# Patient Record
Sex: FEMALE | Race: WHITE | URBAN - METROPOLITAN AREA
[De-identification: names, ages, dates, MRNs, and addresses within clinical notes are randomized per-mention and may not be internally consistent; named-entity substitution may affect disease eponyms.]

---

## 2020-06-09 ENCOUNTER — OFFICE VISIT (OUTPATIENT)
Dept: ENDOCRINOLOGY | Age: 30
End: 2020-06-09
Payer: COMMERCIAL

## 2020-06-09 VITALS
OXYGEN SATURATION: 97 % | HEIGHT: 70 IN | BODY MASS INDEX: 29.6 KG/M2 | WEIGHT: 206.8 LBS | DIASTOLIC BLOOD PRESSURE: 82 MMHG | SYSTOLIC BLOOD PRESSURE: 132 MMHG | TEMPERATURE: 97.9 F | HEART RATE: 76 BPM

## 2020-06-09 PROBLEM — E03.9 ACQUIRED HYPOTHYROIDISM: Status: ACTIVE | Noted: 2020-06-09

## 2020-06-09 PROBLEM — E66.9 CLASS 1 OBESITY WITH BODY MASS INDEX (BMI) OF 30.0 TO 30.9 IN ADULT: Status: ACTIVE | Noted: 2020-06-09

## 2020-06-09 PROBLEM — E06.3 HASHIMOTO'S THYROIDITIS: Status: ACTIVE | Noted: 2020-06-09

## 2020-06-09 PROBLEM — N91.5 OLIGOMENORRHEA: Status: ACTIVE | Noted: 2020-06-09

## 2020-06-09 PROBLEM — R63.5 WEIGHT GAIN, ABNORMAL: Status: ACTIVE | Noted: 2020-06-09

## 2020-06-09 PROCEDURE — 99244 OFF/OP CNSLTJ NEW/EST MOD 40: CPT | Performed by: INTERNAL MEDICINE

## 2020-06-09 RX ORDER — LEVOTHYROXINE SODIUM 0.07 MG/1
TABLET ORAL
Qty: 30 TABLET | Refills: 0
Start: 2020-06-09 | End: 2020-08-24 | Stop reason: SDUPTHER

## 2020-06-09 RX ORDER — FAMOTIDINE 40 MG/1
TABLET, FILM COATED ORAL
COMMUNITY
Start: 2020-06-01

## 2020-06-09 RX ORDER — PANTOPRAZOLE SODIUM 40 MG/1
TABLET, DELAYED RELEASE ORAL
COMMUNITY
Start: 2020-05-29

## 2020-06-09 RX ORDER — LEVOTHYROXINE SODIUM 0.07 MG/1
TABLET ORAL
COMMUNITY
Start: 2020-05-18 | End: 2020-06-09

## 2020-06-09 NOTE — PROGRESS NOTES
FOLATE; Future    3. Class 1 obesity with body mass index (BMI) of 30.0 to 30.9 in adult, unspecified obesity type, unspecified whether serious comorbidity present    - ACTH; Future  - Cortisol AM, Total; Future  - Anti-Thyroglobulin Antibody; Future  - Thyroid Peroxidase Antibody; Future  - Comprehensive Metabolic Panel; Future  - TSH without Reflex; Future  - T3, Free; Future  - T4; Future  - T4, Free; Future  - T3; Future  - T3, Reverse; Future  - VITAMIN B12 & FOLATE; Future    4. Weight gain, abnormal  Gained 50 lbs in 1 year. - Salivary Cortisol    5. Oligomenorrhea  Irregular prior to BCP and IUD  Longest time without period 2.5 months. No hirsutism  - ACTH, cortisol  - Salivary cortisol      Reviewed and/or ordered clinical lab results Yes  Reviewed and/or ordered radiology tests Yes   Reviewed and/or ordered other diagnostic tests No  Discussed test results with performing physician No  Independently reviewed image, tracing, or specimen No  Made a decision to obtain old records No  Reviewed and summarized old records Yes   TSH 3.8-4.47  T4 8.8  T3 129  TPO antibody 51, elevated  Obtained history from other than patient No    Gian Daniels was counseled regarding symptoms of thyroid, oligomenorrhea diagnosis, course and complications of disease if inadequately treated, side effects of medications, diagnosis, treatment options, and prognosis, risks, benefits, complications, and alternatives of treatment, labs, imaging and other studies and treatment targets and goals, work-up, treatment options, different thyroid replacement medications, side effects and benefits, causes of weight gain, cortisol problems, irregular period problems. She understands instructions and counseling. Total visit time 60 minutes, more than 50% was spent in counseling. See assessment, plan and counseling note for details    Return in about 3 weeks (around 6/30/2020) for thyroid problems.     Electronically signed by Linda Moreno Jessi Mckenna MD on 6/9/2020 at 10:33 PM

## 2020-06-30 ENCOUNTER — OFFICE VISIT (OUTPATIENT)
Dept: ENDOCRINOLOGY | Age: 30
End: 2020-06-30
Payer: COMMERCIAL

## 2020-06-30 VITALS
HEART RATE: 87 BPM | TEMPERATURE: 97.5 F | SYSTOLIC BLOOD PRESSURE: 139 MMHG | OXYGEN SATURATION: 97 % | WEIGHT: 208.6 LBS | DIASTOLIC BLOOD PRESSURE: 77 MMHG | BODY MASS INDEX: 29.86 KG/M2 | HEIGHT: 70 IN

## 2020-06-30 PROCEDURE — 99214 OFFICE O/P EST MOD 30 MIN: CPT | Performed by: INTERNAL MEDICINE

## 2020-06-30 RX ORDER — MONTELUKAST SODIUM 10 MG/1
10 TABLET ORAL NIGHTLY
COMMUNITY

## 2020-06-30 RX ORDER — LEVOTHYROXINE SODIUM 0.07 MG/1
TABLET ORAL
Qty: 30 TABLET | Refills: 3 | Status: CANCELLED | OUTPATIENT
Start: 2020-06-30

## 2020-06-30 RX ORDER — CETIRIZINE HYDROCHLORIDE 10 MG/1
10 TABLET ORAL DAILY
COMMUNITY

## 2020-06-30 NOTE — PROGRESS NOTES
seen    Left Lobe: 3 x 1.3 x 1.1 cm. No cysts or nodules are seen   Other Result Information   Interface, Results In - 05/20/2020  8:46 AM EDT  EXAM: US THYROID-NECK-HEAD     INDICATION:  Myxedema heart disease, Myxedema heart disease    COMPARISON:  None    TECHNIQUE:  Multiplanar grayscale analysis of the thyroid was performed. FINDINGS:    The thyroid gland appears normal in size, contour, and echogenicity bilaterally. Isthmus: 3 mm in anteroposterior dimension. No cysts or nodules are seen    Right Lobe: 4 x 2 x 1.3 cm. No cysts or nodules are seen    Left Lobe: 3 x 1.3 x 1.1 cm. No cysts or nodules are seen        IMPRESSION:    No focal lesion identified. Normal study. Approved by Renetta Chavira MD on 5/20/2020 8:26 AM EDT    I have personally reviewed the images and I agree with this report.     Report Verified by: Hemant Delarosa MD at 5/20/2020 8:44 AM EDT         Past Medical History:   Diagnosis Date    Autoimmune disorder ([facility] [address]     Thyroid disease      Patient Active Problem List    Diagnosis Date Noted    Acquired hypothyroidism 06/09/2020    Hashimoto's thyroiditis 06/09/2020    Class 1 obesity with body mass index (BMI) of 30.0 to 30.9 in adult 06/09/2020    Weight gain, abnormal 06/09/2020    Oligomenorrhea 06/09/2020     Past Surgical History:   Procedure Laterality Date    TONSILLECTOMY AND ADENOIDECTOMY  2001    WISDOM TOOTH EXTRACTION  2011     Family History   Problem Relation Age of Onset    No Known Problems Mother     Hypertension Father     High Cholesterol Father      Social History     Socioeconomic History    Marital status:      Spouse name: None    Number of children: None    Years of education: None    Highest education level: None   Occupational History    None   Social Needs    Financial resource strain: None    Food insecurity     Worry: None     Inability: None    Transportation needs     Medical: None     Non-medical: None   Tobacco Use    breath, no wheezing, no dyspnea on exertion, no cough  Cardiovascular: no chest pain, no lower extremity edema, no orthopnea, no intermittent leg claudication, no palpitations  Gastrointestinal: no abdominal pain, no nausea, no vomiting, no diarrhea, no constipation, no dysphagia, no heartburn, no bloating  Genitourinary: no dysuria, no urinary incontinence, no urinary hesitancy, no urinary frequency, no feelings of urinary urgency, no nocturia  Musculoskeletal: no joint swelling, no joint stiffness, no joint pain, no muscle cramps, no muscle pain, no bone pain  Integument/Breast: no hair loss, no skin rashes, no skin lesions, no itching, has dry skin  Neurological: no numbness, no tingling, no weakness, no confusion, no headaches, no dizziness, no fainting, no tremors, no decrease in memory, no balance problems  Psychiatric: has anxiety, no depression, has insomnia  Hematologic/Lymphatic: no tendency for easy bleeding, no swollen lymph nodes, no tendency for easy bruising  Immunology: has seasonal allergies, no frequent infections, no frequent illnesses  Endocrine: no temperature intolerance    /77 (Site: Left Upper Arm, Position: Sitting, Cuff Size: Medium Adult)   Pulse 87   Temp 97.5 °F (36.4 °C) (Infrared)   Ht 5' 9.5\" (1.765 m)   Wt 208 lb 9.6 oz (94.6 kg)   SpO2 97%   BMI 30.36 kg/m²    Wt Readings from Last 3 Encounters:   06/30/20 208 lb 9.6 oz (94.6 kg)   06/09/20 206 lb 12.8 oz (93.8 kg)     Body mass index is 30.36 kg/m².     OBJECTIVE:  Constitutional: no acute distress, well appearing and well nourished  Psychiatric: oriented to person, place and time, judgement and insight and normal, recent and remote memory and intact and mood and affect are normal  Skin: skin and subcutaneous tissue is normal without mass, normal turgor  Head and Face: examination of head and face revealed no abnormalities  Eyes: no lid or conjunctival swelling, erythema or discharge, pupils are normal, equal, round,

## 2020-07-13 DIAGNOSIS — E06.3 HASHIMOTO'S THYROIDITIS: ICD-10-CM

## 2020-07-13 DIAGNOSIS — R63.5 WEIGHT GAIN, ABNORMAL: ICD-10-CM

## 2020-07-13 DIAGNOSIS — E03.9 ACQUIRED HYPOTHYROIDISM: ICD-10-CM

## 2020-07-13 DIAGNOSIS — E66.9 CLASS 1 OBESITY WITH BODY MASS INDEX (BMI) OF 30.0 TO 30.9 IN ADULT, UNSPECIFIED OBESITY TYPE, UNSPECIFIED WHETHER SERIOUS COMORBIDITY PRESENT: ICD-10-CM

## 2020-07-13 DIAGNOSIS — N91.5 OLIGOMENORRHEA, UNSPECIFIED TYPE: ICD-10-CM

## 2020-07-14 ENCOUNTER — VIRTUAL VISIT (OUTPATIENT)
Dept: ENDOCRINOLOGY | Age: 30
End: 2020-07-14
Payer: COMMERCIAL

## 2020-07-14 PROBLEM — R73.01 IFG (IMPAIRED FASTING GLUCOSE): Status: ACTIVE | Noted: 2020-07-14

## 2020-07-14 PROBLEM — E53.8 FOLIC ACID DEFICIENCY: Status: ACTIVE | Noted: 2020-07-14

## 2020-07-14 LAB
IGA: 223 MG/DL (ref 70–400)
TISSUE TRANSGLUTAMINASE IGA: <0.5 U/ML (ref 0–14)

## 2020-07-14 PROCEDURE — 99214 OFFICE O/P EST MOD 30 MIN: CPT | Performed by: INTERNAL MEDICINE

## 2020-07-14 RX ORDER — LANOLIN ALCOHOL/MO/W.PET/CERES
800 CREAM (GRAM) TOPICAL DAILY
Qty: 30 TABLET | Refills: 3
Start: 2020-07-14

## 2020-07-14 NOTE — PROGRESS NOTES
disease, Myxedema heart disease    COMPARISON:  None    TECHNIQUE:  Multiplanar grayscale analysis of the thyroid was performed. FINDINGS:    The thyroid gland appears normal in size, contour, and echogenicity bilaterally. Isthmus: 3 mm in anteroposterior dimension. No cysts or nodules are seen    Right Lobe: 4 x 2 x 1.3 cm. No cysts or nodules are seen    Left Lobe: 3 x 1.3 x 1.1 cm. No cysts or nodules are seen   Other Result Information   Interface, Results In - 05/20/2020  8:46 AM EDT  EXAM: US THYROID-NECK-HEAD     INDICATION:  Myxedema heart disease, Myxedema heart disease    COMPARISON:  None    TECHNIQUE:  Multiplanar grayscale analysis of the thyroid was performed. FINDINGS:    The thyroid gland appears normal in size, contour, and echogenicity bilaterally. Isthmus: 3 mm in anteroposterior dimension. No cysts or nodules are seen    Right Lobe: 4 x 2 x 1.3 cm. No cysts or nodules are seen    Left Lobe: 3 x 1.3 x 1.1 cm. No cysts or nodules are seen        IMPRESSION:    No focal lesion identified. Normal study. Approved by Rory Gordon MD on 5/20/2020 8:26 AM EDT    I have personally reviewed the images and I agree with this report.     Report Verified by: Juan Diego Campos MD at 5/20/2020 8:44 AM EDT         Past Medical History:   Diagnosis Date    Autoimmune disorder ([facility] [address]     Thyroid disease      Patient Active Problem List    Diagnosis Date Noted    IFG (impaired fasting glucose) 57/91/6461    Folic acid deficiency 06/32/6129    Acquired hypothyroidism 06/09/2020    Hashimoto's thyroiditis 06/09/2020    Class 1 obesity with body mass index (BMI) of 30.0 to 30.9 in adult 06/09/2020    Weight gain, abnormal 06/09/2020    Oligomenorrhea 06/09/2020     Past Surgical History:   Procedure Laterality Date    TONSILLECTOMY AND ADENOIDECTOMY  2001    WISDOM TOOTH EXTRACTION  2011     Family History   Problem Relation Age of Onset    No Known Problems Mother     Hypertension Father  High Cholesterol Father      Social History     Socioeconomic History    Marital status:      Spouse name: None    Number of children: None    Years of education: None    Highest education level: None   Occupational History    None   Social Needs    Financial resource strain: None    Food insecurity     Worry: None     Inability: None    Transportation needs     Medical: None     Non-medical: None   Tobacco Use    Smoking status: Never Smoker    Smokeless tobacco: Never Used   Substance and Sexual Activity    Alcohol use: Not Currently    Drug use: Never    Sexual activity: Yes     Partners: Male   Lifestyle    Physical activity     Days per week: None     Minutes per session: None    Stress: None   Relationships    Social connections     Talks on phone: None     Gets together: None     Attends Church service: None     Active member of club or organization: None     Attends meetings of clubs or organizations: None     Relationship status: None    Intimate partner violence     Fear of current or ex partner: None     Emotionally abused: None     Physically abused: None     Forced sexual activity: None   Other Topics Concern    None   Social History Narrative    None     Current Outpatient Medications   Medication Sig Dispense Refill    folic acid (FOLATE) 620 MCG tablet Take 2 tablets by mouth daily 30 tablet 3    cyanocobalamin ([facility] VITAMIN B12) 1000 MCG tablet Take 1 tablet by mouth daily 30 tablet 3    montelukast (SINGULAIR) 10 MG tablet Take 10 mg by mouth nightly      cetirizine (ZYRTEC) 10 MG tablet Take 10 mg by mouth daily      pantoprazole (PROTONIX) 40 MG tablet TAKE 1 TABLET BY MOUTH EVERY DAY      sertraline (ZOLOFT) 50 MG tablet TAKE 1 TABLET BY MOUTH EVERY DAY      famotidine (PEPCID) 40 MG tablet TAKE 1 TABLET BY MOUTH EVERY DAY      levothyroxine (SYNTHROID) 75 MCG tablet TAKE 1 TABLET BY MOUTH EVERY DAY 30 tablet 0     No current facility-administered calculate BMI.     OBJECTIVE:  Constitutional: no apparent distress, well developed and well nourished  Mental status: alert and awake, oriented to person, place and time, able to follow commands  Psychiatric: judgement and insight and normal, recent and remote memory are intact, mood and affect are normal  Skin: skin inspection appears normal, no significant exanthematous lesions or discoloration noted on facial skin  Head and Face: head and face inspection revealed no abnormalities, normocephalic, atraumatic  Eyes: no lid or conjunctival swelling, erythema or discharge, sclera appears normal  Ears/Nose: external inspection of ears and nose revealed no abnormalities, hearing is grossly normal  Oropharynx/Mouth/Face: lips are normal with no lesions, the voice quality was normal  Neck: neck is symmetric, no visualized mass  Pulmonary/chest: respiratory effort normal, no generalized signs of difficulty breathing or signs of respiratory distress  Musculoskeletal: normal station, normal range of motion of neck  Neurological: no facial asymmetry, normal general cortical function      Lab Review:    No results found for: WBC, HGB, HCT, MCV, PLT  Lab Results   Component Value Date     06/30/2020    K 4.4 06/30/2020     06/30/2020    CO2 24 06/30/2020    BUN 12 06/30/2020    CREATININE 0.6 06/30/2020    GLUCOSE 106 06/30/2020    CALCIUM 9.5 06/30/2020    PROT 7.1 06/30/2020    LABALBU 4.4 06/30/2020    BILITOT <0.2 06/30/2020    ALKPHOS 99 06/30/2020    AST 15 06/30/2020    ALT 10 06/30/2020    LABGLOM >60 06/30/2020    GFRAA >60 06/30/2020    AGRATIO 1.6 06/30/2020    GLOB 2.7 06/30/2020     Lab Results   Component Value Date    TSH 0.99 06/30/2020    FT3 3.3 06/30/2020     No results found for: LABA1C  No results found for: EAG  No results found for: CHOL  No results found for: TRIG  No results found for: HDL  No results found for: LDLCHOLESTEROL, LDLCALC  No results found for: LABVLDL, VLDL  No results found for: CHOLHDLRATIO  No results found for: LABMICR, [id number]  No results found for: VITD25     ASSESSMENT/PLAN:  1. Acquired hypothyroidism  TSH 0.99  Recommend to continue levothyroxine 0.075 mg daily  - TSH without Reflex; Future  - T3, Free; Future  - T4, Free; Future    2. Hashimoto's thyroiditis  TPO 45  - Anti-Thyroglobulin Antibody; Future  - Thyroid Peroxidase Antibody; Future  -Thyroid sonogram normal    3. Class 1 obesity with body mass index (BMI) of 30.0 to 30.9 in adult, unspecified obesity type, unspecified whether serious comorbidity present  -Diet and exercise    4. Weight gain, abnormal  Celiac screening negative  Gained 50 lbs in 1 year. - Salivary Cortisol pending, call for results    5. Oligomenorrhea  Irregular prior to BCP and IUD  Longest time without period 2.5 months. No hirsutism  - ACTH, cortisol  - Salivary cortisol  -LH, FSH, estradiol  -Prolactin    6. IFG  Uncle has type 2 diabetes  Repeat glucose  HbA1C    7.  Folic acid deficiency  Start folate 800 IU  daily    Reviewed and/or ordered clinical lab results Yes  Reviewed and/or ordered radiology tests Yes   Reviewed and/or ordered other diagnostic tests No  Discussed test results with performing physician No  Independently reviewed image, tracing, or specimen No  Made a decision to obtain old records No  Reviewed and summarized old records Yes   TSH 3.8-4.47  T4 8.8  T3 129  TPO antibody 51, elevated  Obtained history from other than patient No    Toya Stoner was counseled regarding symptoms of thyroid, oligomenorrhea diagnosis, course and complications of disease if inadequately treated, side effects of medications, diagnosis, treatment options, and prognosis, risks, benefits, complications, and alternatives of treatment, labs, imaging and other studies and treatment targets and goals, work-up, treatment options, different thyroid replacement medications, side effects and benefits, causes of weight gain, cortisol problems, irregular period causes, work-up. She understands instructions and counseling. Christian Reeves is a 31 y.o. female being evaluated by a Virtual Visit (video visit) encounter, including two-way audio and video communication, in lieu of an in-person visit due to coronavirus emergency, to address concerns as mentioned in history and assessment and plan. Patient identification was verified at the start of the visit. I conducted an interview, performed a limited exam by video and educated the patient on my assessment and plan. Due to this being a TeleHealth encounter (During hospitalsP-24 public health emergency), evaluation of the following organ systems was limited: Vitals/Constitutional/EENT/Resp/CV/GI//MS/Neuro/Skin/Heme-Lymph-Imm. Pursuant to the emergency declaration under the [address] [place] [address], [address] [place] [address] authority and the [employer] Act, this Virtual Visit was conducted with patient's (and/or legal guardian's) consent, to reduce the patient's risk of exposure to COVID-19 and provide necessary medical care. The patient (and/or legal guardian) has also been advised to contact this office for worsening conditions or problems, and seek emergency medical treatment and/or call 911 if deemed necessary. Total time spent on this encounter via Telehealth (synchronous, real-time audio/visual connection): 25 min. See assessment, plan and counseling note for counseling and care coordination details. Services were provided through a video synchronous discussion virtually to substitute for in-person clinic visit. Persons participating in the telehealth service: provider - Farooq Kee MD and patient Christian Reeves. Provider was located at her office. Patient was located at home. --Farooq Kee MD on 7/14/2020 at 11:35 PM    An electronic signature was used to authenticate this note.       Return in

## 2020-07-15 LAB
CORTISOL SALIVARY: 0.04 UG/DL
CORTISOL SALIVARY: 0.05 UG/DL
CORTISOL SALIVARY: 0.06 UG/DL

## 2020-07-21 ENCOUNTER — TELEPHONE (OUTPATIENT)
Dept: ENDOCRINOLOGY | Age: 30
End: 2020-07-21

## 2020-08-24 RX ORDER — LEVOTHYROXINE SODIUM 0.07 MG/1
TABLET ORAL
Qty: 90 TABLET | Refills: 0 | Status: SHIPPED | OUTPATIENT
Start: 2020-08-24 | End: 2020-09-22

## 2020-09-22 RX ORDER — LEVOTHYROXINE SODIUM 0.07 MG/1
TABLET ORAL
Qty: 30 TABLET | Refills: 0 | Status: SHIPPED | OUTPATIENT
Start: 2020-09-22 | End: 2020-10-15 | Stop reason: SDUPTHER

## 2020-10-15 RX ORDER — LEVOTHYROXINE SODIUM 0.07 MG/1
TABLET ORAL
Qty: 30 TABLET | Refills: 0
Start: 2020-10-15 | End: 2020-10-16

## 2020-10-16 ENCOUNTER — TELEPHONE (OUTPATIENT)
Dept: ENDOCRINOLOGY | Age: 30
End: 2020-10-16

## 2020-10-16 ENCOUNTER — OFFICE VISIT (OUTPATIENT)
Dept: ENDOCRINOLOGY | Age: 30
End: 2020-10-16
Payer: COMMERCIAL

## 2020-10-16 VITALS
DIASTOLIC BLOOD PRESSURE: 72 MMHG | WEIGHT: 222 LBS | BODY MASS INDEX: 32.31 KG/M2 | TEMPERATURE: 98.2 F | HEART RATE: 74 BPM | SYSTOLIC BLOOD PRESSURE: 131 MMHG

## 2020-10-16 DIAGNOSIS — N91.5 OLIGOMENORRHEA, UNSPECIFIED TYPE: ICD-10-CM

## 2020-10-16 DIAGNOSIS — R73.01 IFG (IMPAIRED FASTING GLUCOSE): ICD-10-CM

## 2020-10-16 DIAGNOSIS — E53.8 FOLIC ACID DEFICIENCY: ICD-10-CM

## 2020-10-16 DIAGNOSIS — E03.9 ACQUIRED HYPOTHYROIDISM: ICD-10-CM

## 2020-10-16 DIAGNOSIS — R63.5 WEIGHT GAIN, ABNORMAL: ICD-10-CM

## 2020-10-16 LAB
A/G RATIO: 1.5 (ref 1.1–2.2)
ALBUMIN SERPL-MCNC: 4.1 G/DL (ref 3.4–5)
ALP BLD-CCNC: 111 U/L (ref 40–129)
ALT SERPL-CCNC: 11 U/L (ref 10–40)
ANION GAP SERPL CALCULATED.3IONS-SCNC: 11 MMOL/L (ref 3–16)
AST SERPL-CCNC: 14 U/L (ref 15–37)
BILIRUB SERPL-MCNC: <0.2 MG/DL (ref 0–1)
BUN BLDV-MCNC: 16 MG/DL (ref 7–20)
CALCIUM SERPL-MCNC: 9.2 MG/DL (ref 8.3–10.6)
CHLORIDE BLD-SCNC: 102 MMOL/L (ref 99–110)
CO2: 25 MMOL/L (ref 21–32)
CREAT SERPL-MCNC: 0.7 MG/DL (ref 0.6–1.1)
ESTIMATED AVERAGE GLUCOSE: 111.2 MG/DL
FOLATE: 14.74 NG/ML (ref 4.78–24.2)
GFR AFRICAN AMERICAN: >60
GFR NON-AFRICAN AMERICAN: >60
GLOBULIN: 2.7 G/DL
GLUCOSE BLD-MCNC: 104 MG/DL (ref 70–99)
HBA1C MFR BLD: 5.5 %
POTASSIUM SERPL-SCNC: 4.3 MMOL/L (ref 3.5–5.1)
SODIUM BLD-SCNC: 138 MMOL/L (ref 136–145)
T4 FREE: 1.2 NG/DL (ref 0.9–1.8)
TOTAL PROTEIN: 6.8 G/DL (ref 6.4–8.2)
TSH SERPL DL<=0.05 MIU/L-ACNC: 2.14 UIU/ML (ref 0.27–4.2)
VITAMIN B-12: 454 PG/ML (ref 211–911)

## 2020-10-16 PROCEDURE — 99214 OFFICE O/P EST MOD 30 MIN: CPT | Performed by: INTERNAL MEDICINE

## 2020-10-16 RX ORDER — LEVOTHYROXINE SODIUM 0.07 MG/1
TABLET ORAL
Qty: 90 TABLET | Refills: 1 | Status: SHIPPED | OUTPATIENT
Start: 2020-10-16 | End: 2021-02-28

## 2020-10-16 NOTE — PROGRESS NOTES
SUBJECTIVE:  Jose Alberto Noriega is a 31 y.o. female who is being evaluated for hypothyroidism. 1. Acquired hypothyroidism    This started in 2019. Patient was diagnosed with hypothyroidism. The problem has been gradually worsening. Previous thyroid studies include: TSH and free thyroxine. Patient started medication in 2019. Currently patient is on: levothyroxine. Misses  0 doses a month. Current complaints: fatigue, dry skin. Periods light on IUD    2. Hashimoto's thyroiditis  History of obstructive symptoms: difficulty swallowing No, changes in voice/hoarseness No.  History of radiation to patient's neck: No  Resent iodine exposure: No  Family history includes no thyroid abnormalities. Family history of thyroid cancer: No    3. Obesity  Gained 50 lbs in 1 year. There was no change in diet and activity level. On modified paleo diet, gluten free, dairy free. Eats healthy. Ideal weight 145 lbs. Goal weight 145-155 lbs    4. Weight gain, abnormal  Gained 50 lbs in 1 year. 5. Oligomenorrhea  Irregular prior to BCP and IUD  Longest time without period 2.5 months. No hirsutism    6. Elevated fasting glucose   Glucose 106    7. Folic acid deficiency  Folate 4.22    IMPRESSION:    No focal lesion identified. Normal study. Approved by Lissette Armando MD on 5/20/2020 8:26 AM EDT    I have personally reviewed the images and I agree with this report. Report Verified by: Angle Yao MD at 5/20/2020 8:44 AM EDT   Result Narrative   EXAM: US THYROID-NECK-HEAD     INDICATION:  Myxedema heart disease, Myxedema heart disease    COMPARISON:  None    TECHNIQUE:  Multiplanar grayscale analysis of the thyroid was performed. FINDINGS:    The thyroid gland appears normal in size, contour, and echogenicity bilaterally. Isthmus: 3 mm in anteroposterior dimension. No cysts or nodules are seen    Right Lobe: 4 x 2 x 1.3 cm. No cysts or nodules are seen    Left Lobe: 3 x 1.3 x 1.1 cm.  No cysts or nodules are decrease in hearing, no hoarseness, no dry mouth, has sinus problems, no difficulty swallowing, no neck lumps, no dental problems, no mouth sores, no ringing in ears  Pulmonary: no shortness of breath, no wheezing, no dyspnea on exertion, no cough  Cardiovascular: no chest pain, no lower extremity edema, no orthopnea, no intermittent leg claudication, no palpitations  Gastrointestinal: no abdominal pain, no nausea, no vomiting, no diarrhea, no constipation, no dysphagia, no heartburn, no bloating  Genitourinary: no dysuria, no urinary incontinence, no urinary hesitancy, no urinary frequency, no feelings of urinary urgency, no nocturia  Musculoskeletal: no joint swelling, no joint stiffness, no joint pain, no muscle cramps, no muscle pain, no bone pain  Integument/Breast: no hair loss, no skin rashes, no skin lesions, no itching, has dry skin  Neurological: no numbness, no tingling, no weakness, no confusion, no headaches, no dizziness, no fainting, no tremors, no decrease in memory, no balance problems  Psychiatric: has anxiety, no depression, has insomnia  Hematologic/Lymphatic: no tendency for easy bleeding, no swollen lymph nodes, no tendency for easy bruising  Immunology: has seasonal allergies, no frequent infections, no frequent illnesses  Endocrine: no temperature intolerance    /72   Pulse 74   Temp 98.2 °F (36.8 °C) (Temporal)   Wt 222 lb (100.7 kg)   BMI 32.31 kg/m²    Wt Readings from Last 3 Encounters:   10/16/20 222 lb (100.7 kg)   06/30/20 208 lb 9.6 oz (94.6 kg)   06/09/20 206 lb 12.8 oz (93.8 kg)     Body mass index is 32.31 kg/m².     OBJECTIVE:  Constitutional: no acute distress, well appearing and well nourished  Psychiatric: oriented to person, place and time, judgement and insight and normal, recent and remote memory and intact and mood and affect are normal  Skin: skin and subcutaneous tissue is normal without mass, normal turgor  Head and Face: examination of head and face revealed no abnormalities  Eyes: no lid or conjunctival swelling, erythema or discharge, pupils are normal, equal, round, reactive to light  Ears/Nose: external inspection of ears and nose revealed no abnormalities, hearing is grossly normal  Oropharynx/Mouth/Face: lips, tongue and gums are normal with no lesions, the voice quality was normal  Neck: neck is supple and symmetric, with midline trachea and no masses, thyroid is normal  Lymphatics: normal cervical lymph nodes, normal supraclavicular nodes  Pulmonary: no increased work of breathing or signs of respiratory distress, lungs are clear to auscultation  Cardiovascular: normal heart rate and rhythm, normal S1 and S2, no murmurs and pedal pulses and 2+ bilaterally, No edema  Abdomen: abdomen is soft, non-tender with no masses  Musculoskeletal: normal gait and station and exam of the digits and nails are normal  Neurological: normal coordination and normal general cortical function      Lab Review:    No results found for: WBC, HGB, HCT, MCV, PLT  Lab Results   Component Value Date     06/30/2020    K 4.4 06/30/2020     06/30/2020    CO2 24 06/30/2020    BUN 12 06/30/2020    CREATININE 0.6 06/30/2020    GLUCOSE 106 06/30/2020    CALCIUM 9.5 06/30/2020    PROT 7.1 06/30/2020    LABALBU 4.4 06/30/2020    BILITOT <0.2 06/30/2020    ALKPHOS 99 06/30/2020    AST 15 06/30/2020    ALT 10 06/30/2020    LABGLOM >60 06/30/2020    GFRAA >60 06/30/2020    AGRATIO 1.6 06/30/2020    GLOB 2.7 06/30/2020     Lab Results   Component Value Date    TSH 0.99 06/30/2020    FT3 3.3 06/30/2020     No results found for: LABA1C  No results found for: EAG  No results found for: CHOL  No results found for: TRIG  No results found for: HDL  No results found for: LDLCHOLESTEROL, LDLCALC  No results found for: LABVLDL, VLDL  No results found for: CHOLHDLRATIO  No results found for: LABMICR, [id number]  No results found for: VITD25     ASSESSMENT/PLAN:  1.  Acquired hypothyroidism  Call for results  TSH 0.99  Recommend to continue levothyroxine 0.075 mg daily  Reevaluate when results are available  Patient did not do lab work. - TSH without Reflex; Future  - T3, Free; Future  - T4, Free; Future    2. Hashimoto's thyroiditis  TPO antibody positive  -Thyroid sonogram normal    3. Obesity  -Diet and exercise    4. Weight gain, abnormal  Gained 50 lbs in 1 year. - Salivary Cortisol    5. Oligomenorrhea  Testosterone, DHEAS  Obtain Pelvic US report from OB-GYN  Irregular prior to BCP and IUD  Longest time without period 2.5 months. No hirsutism  - ACTH, cortisol normal  - Salivary cortisol normal  -LH, FSH, estradiol  -Prolactin normal    6. Elevated fasting glucose   Glucose 106 fasting    7. Folic acid deficiency  Folate 4.22      Reviewed and/or ordered clinical lab results Yes  Reviewed and/or ordered radiology tests Yes   Reviewed and/or ordered other diagnostic tests No  Discussed test results with performing physician No  Independently reviewed image, tracing, or specimen No  Made a decision to obtain old records No  Reviewed and summarized old records Yes   TSH 3.8-4.47  T4 8.8  T3 129  TPO antibody 51, elevated  Obtained history from other than patient No    Hershell Model was counseled regarding symptoms of thyroid, oligomenorrhea diagnosis, course and complications of disease if inadequately treated, side effects of medications, diagnosis, treatment options, and prognosis, risks, benefits, complications, and alternatives of treatment, labs, imaging and other studies and treatment targets and goals, work-up, treatment options, different thyroid replacement medications, side effects and benefits, causes of weight gain, cortisol problems, irregular period causes some work-up. She understands instructions and counseling. Total visit time 25 minutes, more than 50% was spent in counseling.   See assessment, plan and counseling note for details    Return in about 4 months (around 2/16/2021) for thyroid problems.     Electronically signed by Yocasta Patel MD on 10/16/2020 at 7:26 AM

## 2020-10-16 NOTE — TELEPHONE ENCOUNTER
Pt called and states that the office has to call her OBGYN office and request the Ultrasound (Uterus) that Dr Derek Butler is requesting to review.  The # to call Shelley

## 2020-10-18 ENCOUNTER — TELEPHONE (OUTPATIENT)
Dept: ENDOCRINOLOGY | Age: 30
End: 2020-10-18

## 2020-10-19 NOTE — TELEPHONE ENCOUNTER
Please inform patient about the results:  Hemoglobin A1c 5.5, very good. Glucose was slightly elevated, 104. Thyroid test was good. Otherwise chemistry panel was good. V86 and folic acid were in range. DHEAS, testosterone not back yet. Please see patient's message regarding request from OB/GYN for her pelvic ultrasound test.  Please call her OB/GYN and requested the pelvic ultrasound report.

## 2020-10-20 LAB
SEX HORMONE BINDING GLOBULIN: 50 NMOL/L (ref 30–135)
TESTOSTERONE FREE-NONMALE: 2.6 PG/ML (ref 0.8–7.4)
TESTOSTERONE TOTAL: 19 NG/DL (ref 20–70)

## 2020-10-20 NOTE — TELEPHONE ENCOUNTER
PT called back, I gave message and she stated her understanding.  Also she will sign the waiver at her OB-GYN's office so that they will release her Pelvic US report to Dr. Nan España by fax

## 2020-10-21 ENCOUNTER — TELEPHONE (OUTPATIENT)
Dept: ENDOCRINOLOGY | Age: 30
End: 2020-10-21

## 2020-10-21 LAB — DHEAS (DHEA SULFATE): 159 UG/DL (ref 65–380)

## 2020-10-29 ENCOUNTER — TELEPHONE (OUTPATIENT)
Dept: ENDOCRINOLOGY | Age: 30
End: 2020-10-29

## 2020-10-29 NOTE — TELEPHONE ENCOUNTER
PT has a question to ask Dr. Alli Dickinson. She wants to know if it's ok with her if she sees a Functional Family Provider in addition to seeing Dr. Alli Dickinson.

## 2020-10-30 NOTE — TELEPHONE ENCOUNTER
It is okay to see functional medicine doctor. She can come to see me for follow-up appointment afterwards as scheduled and then we can decide. It all depends what specifically functional medicine physician will be treating.

## 2021-02-19 ENCOUNTER — OFFICE VISIT (OUTPATIENT)
Dept: ENDOCRINOLOGY | Age: 31
End: 2021-02-19
Payer: COMMERCIAL

## 2021-02-19 VITALS
HEIGHT: 70 IN | RESPIRATION RATE: 14 BRPM | DIASTOLIC BLOOD PRESSURE: 74 MMHG | HEART RATE: 88 BPM | SYSTOLIC BLOOD PRESSURE: 122 MMHG | BODY MASS INDEX: 30.92 KG/M2 | WEIGHT: 216 LBS

## 2021-02-19 DIAGNOSIS — E03.9 ACQUIRED HYPOTHYROIDISM: Primary | ICD-10-CM

## 2021-02-19 DIAGNOSIS — E06.3 HASHIMOTO'S THYROIDITIS: ICD-10-CM

## 2021-02-19 DIAGNOSIS — E53.8 FOLIC ACID DEFICIENCY: ICD-10-CM

## 2021-02-19 DIAGNOSIS — N91.5 OLIGOMENORRHEA, UNSPECIFIED TYPE: ICD-10-CM

## 2021-02-19 DIAGNOSIS — E03.9 ACQUIRED HYPOTHYROIDISM: ICD-10-CM

## 2021-02-19 DIAGNOSIS — R63.5 WEIGHT GAIN, ABNORMAL: ICD-10-CM

## 2021-02-19 DIAGNOSIS — E66.9 CLASS 1 OBESITY WITH BODY MASS INDEX (BMI) OF 31.0 TO 31.9 IN ADULT, UNSPECIFIED OBESITY TYPE, UNSPECIFIED WHETHER SERIOUS COMORBIDITY PRESENT: ICD-10-CM

## 2021-02-19 DIAGNOSIS — R73.01 IFG (IMPAIRED FASTING GLUCOSE): ICD-10-CM

## 2021-02-19 LAB
A/G RATIO: 1.6 (ref 1.1–2.2)
ALBUMIN SERPL-MCNC: 4.4 G/DL (ref 3.4–5)
ALP BLD-CCNC: 100 U/L (ref 40–129)
ALT SERPL-CCNC: 11 U/L (ref 10–40)
ANION GAP SERPL CALCULATED.3IONS-SCNC: 9 MMOL/L (ref 3–16)
AST SERPL-CCNC: 14 U/L (ref 15–37)
BILIRUB SERPL-MCNC: <0.2 MG/DL (ref 0–1)
BUN BLDV-MCNC: 10 MG/DL (ref 7–20)
CALCIUM SERPL-MCNC: 9.7 MG/DL (ref 8.3–10.6)
CHLORIDE BLD-SCNC: 102 MMOL/L (ref 99–110)
CO2: 26 MMOL/L (ref 21–32)
CREAT SERPL-MCNC: 0.7 MG/DL (ref 0.6–1.1)
FOLATE: 14.51 NG/ML (ref 4.78–24.2)
GFR AFRICAN AMERICAN: >60
GFR NON-AFRICAN AMERICAN: >60
GLOBULIN: 2.8 G/DL
GLUCOSE BLD-MCNC: 89 MG/DL (ref 70–99)
POTASSIUM SERPL-SCNC: 4.2 MMOL/L (ref 3.5–5.1)
SODIUM BLD-SCNC: 137 MMOL/L (ref 136–145)
T3 FREE: 3.5 PG/ML (ref 2.3–4.2)
T4 FREE: 1.3 NG/DL (ref 0.9–1.8)
TOTAL PROTEIN: 7.2 G/DL (ref 6.4–8.2)
TSH SERPL DL<=0.05 MIU/L-ACNC: 4.63 UIU/ML (ref 0.27–4.2)
VITAMIN B-12: 618 PG/ML (ref 211–911)

## 2021-02-19 PROCEDURE — 99214 OFFICE O/P EST MOD 30 MIN: CPT | Performed by: INTERNAL MEDICINE

## 2021-02-19 NOTE — PROGRESS NOTES
SUBJECTIVE:  Tania Heredia is a 31 y.o. female who is being evaluated for hypothyroidism. 1. Acquired hypothyroidism    This started in 2019. Patient was diagnosed with hypothyroidism. The problem has been gradually worsening. Previous thyroid studies include: TSH and free thyroxine. Patient started medication in 2019. Currently patient is on: levothyroxine. Misses  0 doses a month. Current complaints: fatigue, dry skin, brain fog, decrease in memory. Patient stated that she experiences flareups once in 1 to 3 months when she has extreme fatigue, brain fog, cannot remember things and has difficulty performing daily duties or work. She states that she sleeps a lot during these episodes. They last 1 day. May improve with rest.  She experiences these flareups more during episodes of stress, and improving of stress level or rest helps with them. Periods light on IUD    2. Hashimoto's thyroiditis  History of obstructive symptoms: difficulty swallowing No, changes in voice/hoarseness No.  History of radiation to patient's neck: No  Resent iodine exposure: No  Family history includes no thyroid abnormalities. Family history of thyroid cancer: No    3. Obesity  Gained 50 lbs in 1 year. There was no change in diet and activity level. On modified paleo diet, gluten free, dairy free. Eats healthy. Ideal weight 145 lbs. Goal weight 145-155 lbs    4. Weight gain, abnormal  Gained 50 lbs in 1 year. 5. Oligomenorrhea  Irregular prior to BCP and IUD  Longest time without period 2.5 months. No hirsutism    6. Elevated fasting glucose   Glucose 106    7. Folic acid deficiency  Folate 4.22    IMPRESSION:    No focal lesion identified. Normal study. Approved by Arnold Almanzar MD on 5/20/2020 8:26 AM EDT    I have personally reviewed the images and I agree with this report.     Report Verified by: Keila You MD at 5/20/2020 8:44 AM EDT   Result Narrative   EXAM: US THYROID-NECK-HEAD     INDICATION:  Myxedema heart disease, Myxedema heart disease    COMPARISON:  None    TECHNIQUE:  Multiplanar grayscale analysis of the thyroid was performed. FINDINGS:    The thyroid gland appears normal in size, contour, and echogenicity bilaterally. Isthmus: 3 mm in anteroposterior dimension. No cysts or nodules are seen    Right Lobe: 4 x 2 x 1.3 cm. No cysts or nodules are seen    Left Lobe: 3 x 1.3 x 1.1 cm. No cysts or nodules are seen   Other Result Information   Interface, Results In - 05/20/2020  8:46 AM EDT  EXAM: US THYROID-NECK-HEAD     INDICATION:  Myxedema heart disease, Myxedema heart disease    COMPARISON:  None    TECHNIQUE:  Multiplanar grayscale analysis of the thyroid was performed. FINDINGS:    The thyroid gland appears normal in size, contour, and echogenicity bilaterally. Isthmus: 3 mm in anteroposterior dimension. No cysts or nodules are seen    Right Lobe: 4 x 2 x 1.3 cm. No cysts or nodules are seen    Left Lobe: 3 x 1.3 x 1.1 cm. No cysts or nodules are seen        IMPRESSION:    No focal lesion identified. Normal study. Approved by Sarah Rivera MD on 5/20/2020 8:26 AM EDT    I have personally reviewed the images and I agree with this report.     Report Verified by: Anh Byrne MD at 5/20/2020 8:44 AM EDT         Past Medical History:   Diagnosis Date    Autoimmune disorder ([facility] [address]     Thyroid disease      Patient Active Problem List    Diagnosis Date Noted    IFG (impaired fasting glucose) 64/62/4933    Folic acid deficiency 61/52/6029    Acquired hypothyroidism 06/09/2020    Hashimoto's thyroiditis 06/09/2020    Class 1 obesity with body mass index (BMI) of 32.0 to 32.9 in adult 06/09/2020    Weight gain, abnormal 06/09/2020    Oligomenorrhea 06/09/2020     Past Surgical History:   Procedure Laterality Date    TONSILLECTOMY AND ADENOIDECTOMY  2001    WISDOM TOOTH EXTRACTION  2011     Family History   Problem Relation Age of Onset    No Known Problems Mother    Jessica Muhammad Hypertension Father     High Cholesterol Father      Social History     Socioeconomic History    Marital status:      Spouse name: None    Number of children: None    Years of education: None    Highest education level: None   Occupational History    None   Social Needs    Financial resource strain: None    Food insecurity     Worry: None     Inability: None    Transportation needs     Medical: None     Non-medical: None   Tobacco Use    Smoking status: Never Smoker    Smokeless tobacco: Never Used   Substance and Sexual Activity    Alcohol use: Not Currently    Drug use: Never    Sexual activity: Yes     Partners: Male   Lifestyle    Physical activity     Days per week: None     Minutes per session: None    Stress: None   Relationships    Social connections     Talks on phone: None     Gets together: None     Attends Restorationist service: None     Active member of club or organization: None     Attends meetings of clubs or organizations: None     Relationship status: None    Intimate partner violence     Fear of current or ex partner: None     Emotionally abused: None     Physically abused: None     Forced sexual activity: None   Other Topics Concern    None   Social History Narrative    None     Current Outpatient Medications   Medication Sig Dispense Refill    levothyroxine (SYNTHROID) 75 MCG tablet Take 1 tablet by mouth every day.  90 tablet 1    folic acid (FOLATE) 158 MCG tablet Take 2 tablets by mouth daily 30 tablet 3    cyanocobalamin ([employer] VITAMIN B12) 1000 MCG tablet Take 1 tablet by mouth daily 30 tablet 3    montelukast (SINGULAIR) 10 MG tablet Take 10 mg by mouth nightly      cetirizine (ZYRTEC) 10 MG tablet Take 10 mg by mouth daily      sertraline (ZOLOFT) 50 MG tablet TAKE 1 TABLET BY MOUTH EVERY DAY      famotidine (PEPCID) 40 MG tablet TAKE 1 TABLET BY MOUTH EVERY DAY      pantoprazole (PROTONIX) 40 MG tablet TAKE 1 TABLET BY MOUTH EVERY DAY       No current facility-administered medications for this visit.       No Known Allergies  Family Status   Relation Name Status    Mother  Alive    Father  Alive       Review of Systems:  Constitutional: has fatigue, no fever, has recent weight gain, no recent weight loss, no changes in appetite  Eyes: no eye pain, no change in vision, no eye redness, no eye irritation, no double vision  Ears, nose, throat: has nasal congestion, no sore throat, no earache, no decrease in hearing, no hoarseness, no dry mouth, has sinus problems, no difficulty swallowing, no neck lumps, no dental problems, no mouth sores, no ringing in ears  Pulmonary: no shortness of breath, no wheezing, no dyspnea on exertion, no cough  Cardiovascular: no chest pain, no lower extremity edema, no orthopnea, no intermittent leg claudication, no palpitations  Gastrointestinal: no abdominal pain, no nausea, no vomiting, no diarrhea, no constipation, no dysphagia, no heartburn, no bloating  Genitourinary: no dysuria, no urinary incontinence, no urinary hesitancy, no urinary frequency, no feelings of urinary urgency, no nocturia  Musculoskeletal: no joint swelling, no joint stiffness, no joint pain, no muscle cramps, no muscle pain, no bone pain  Integument/Breast: no hair loss, no skin rashes, no skin lesions, no itching, has dry skin  Neurological: no numbness, no tingling, no weakness, no confusion, no headaches, no dizziness, no fainting, no tremors, no decrease in memory, no balance problems  Psychiatric: has anxiety, no depression, has insomnia  Hematologic/Lymphatic: no tendency for easy bleeding, no swollen lymph nodes, no tendency for easy bruising  Immunology: has seasonal allergies, no frequent infections, no frequent illnesses  Endocrine: no temperature intolerance    /74   Pulse 88   Resp 14   Ht 5' 9.5\" (1.765 m)   Wt 216 lb (98 kg)   LMP 12/19/2020   BMI 31.44 kg/m²    Wt Readings from Last 3 Encounters:   02/19/21 216 lb (98 kg)   10/16/20 222 lb (100.7 kg)   06/30/20 208 lb 9.6 oz (94.6 kg)     Body mass index is 31.44 kg/m².     OBJECTIVE:  Constitutional: no acute distress, well appearing and well nourished  Psychiatric: oriented to person, place and time, judgement and insight and normal, recent and remote memory and intact and mood and affect are normal  Skin: skin and subcutaneous tissue is normal without mass, normal turgor  Head and Face: examination of head and face revealed no abnormalities  Eyes: no lid or conjunctival swelling, erythema or discharge, pupils are normal, equal, round, reactive to light  Ears/Nose: external inspection of ears and nose revealed no abnormalities, hearing is grossly normal  Oropharynx/Mouth/Face: lips, tongue and gums are normal with no lesions, the voice quality was normal  Neck: neck is supple and symmetric, with midline trachea and no masses, thyroid is normal  Lymphatics: normal cervical lymph nodes, normal supraclavicular nodes  Pulmonary: no increased work of breathing or signs of respiratory distress, lungs are clear to auscultation  Cardiovascular: normal heart rate and rhythm, normal S1 and S2, no murmurs and pedal pulses and 2+ bilaterally, No edema  Abdomen: abdomen is soft, non-tender with no masses  Musculoskeletal: normal gait and station and exam of the digits and nails are normal  Neurological: normal coordination and normal general cortical function      Lab Review:    No results found for: WBC, HGB, HCT, MCV, PLT  Lab Results   Component Value Date     10/16/2020    K 4.3 10/16/2020     10/16/2020    CO2 25 10/16/2020    BUN 16 10/16/2020    CREATININE 0.7 10/16/2020    GLUCOSE 104 10/16/2020    CALCIUM 9.2 10/16/2020    PROT 6.8 10/16/2020    LABALBU 4.1 10/16/2020    BILITOT <0.2 10/16/2020    ALKPHOS 111 10/16/2020    AST 14 10/16/2020    ALT 11 10/16/2020    LABGLOM >60 10/16/2020    GFRAA >60 10/16/2020    AGRATIO 1.5 10/16/2020    GLOB 2.7 10/16/2020     Lab Results   Component Value Date    TSH 2.14 10/16/2020    FT3 3.3 06/30/2020     Lab Results   Component Value Date    LABA1C 5.5 10/16/2020     Lab Results   Component Value Date    .2 10/16/2020     No results found for: CHOL  No results found for: TRIG  No results found for: HDL  No results found for: LDLCHOLESTEROL, LDLCALC  No results found for: LABVLDL, VLDL  No results found for: CHOLHDLRATIO  No results found for: LABMICR, [id number]  No results found for: VITD25     ASSESSMENT/PLAN:  1. Acquired hypothyroidism  Call for results  TSH 0.99-2.1  Recommend to continue levothyroxine 0.075 mg daily  Reevaluate when results are available  Patient did not do lab work. - TSH without Reflex; Future  - T3, Free; Future  - T4, Free; Future    2. Hashimoto's thyroiditis  TPO antibody positive  -Thyroid sonogram normal    3. Obesity  -Diet and exercise    4. Weight gain, abnormal  Gained 50 lbs in 1 year. - Salivary Cortisol normal    5. Oligomenorrhea  Free Testosterone, DHEAS normal  Obtain Pelvic US report from OB-GYN  Irregular prior to BCP and IUD  Longest time without period 2.5 months. No hirsutism  - ACTH, cortisol normal  - Salivary cortisol normal  -LH, FSH, estradiol  -Prolactin normal    6. Elevated fasting glucose   Glucose 106-104 fasting  HbA1C 5.5    7.   Folic acid deficiency  Folate 4.22-14      Reviewed and/or ordered clinical lab results Yes  Reviewed and/or ordered radiology tests Yes   Reviewed and/or ordered other diagnostic tests No  Discussed test results with performing physician No  Independently reviewed image, tracing, or specimen No  Made a decision to obtain old records No  Reviewed and summarized old records Yes   TSH 3.8-4.47  T4 8.8  T3 129  TPO antibody 51, elevated  Obtained history from other than patient No    Gretta Taz was counseled regarding symptoms of thyroid, oligomenorrhea diagnosis, course and complications of disease if inadequately treated, side effects of medications, diagnosis, treatment options, and prognosis, risks, benefits, complications, and alternatives of treatment, labs, imaging and other studies and treatment targets and goals, work-up, treatment options, different thyroid replacement medications, side effects and benefits, causes of weight gain, cortisol problems, irregular period causes some work-up. She understands instructions and counseling. Return in about 3 months (around 5/19/2021) for thyroid problems.     Electronically signed by Sally Kearns MD on 2/19/2021 at 7:29 AM

## 2021-02-20 LAB
ESTIMATED AVERAGE GLUCOSE: 105.4 MG/DL
HBA1C MFR BLD: 5.3 %

## 2021-02-23 LAB
SEX HORMONE BINDING GLOBULIN: 60 NMOL/L (ref 30–135)
TESTOSTERONE FREE-NONMALE: 3.4 PG/ML (ref 0.8–7.4)
TESTOSTERONE TOTAL: 28 NG/DL (ref 20–70)

## 2021-02-24 LAB — DHEAS (DHEA SULFATE): 167 UG/DL (ref 65–380)

## 2021-02-28 ENCOUNTER — TELEPHONE (OUTPATIENT)
Dept: ENDOCRINOLOGY | Age: 31
End: 2021-02-28

## 2021-02-28 DIAGNOSIS — E03.9 ACQUIRED HYPOTHYROIDISM: ICD-10-CM

## 2021-02-28 RX ORDER — LEVOTHYROXINE SODIUM 88 UG/1
TABLET ORAL
Qty: 88 TABLET | Refills: 0 | Status: SHIPPED | OUTPATIENT
Start: 2021-02-28 | End: 2021-03-11 | Stop reason: SDUPTHER

## 2021-02-28 NOTE — TELEPHONE ENCOUNTER
I spoke with patient. Informed about lab results. TSH is worse. Recommend to increase levothyroxine to 0.088 mg daily. Sent prescription to pharmacy. Order labs for May appointment. Patient feels flares up of severe fatigue, presenting with a brain fog, decrease in memory and difficulty performing at work and at home. I advised patient make sure to discuss this with family doctor.

## 2021-03-01 ENCOUNTER — TELEPHONE (OUTPATIENT)
Dept: ENDOCRINOLOGY | Age: 31
End: 2021-03-01

## 2021-03-01 DIAGNOSIS — E03.9 ACQUIRED HYPOTHYROIDISM: ICD-10-CM

## 2021-03-01 NOTE — TELEPHONE ENCOUNTER
[phone] (home)      [facility]  To inform patient that FMLA form is completed. Asking patient is she would like form to be faxed or if she wanted to PU at office. Informed to call the office at [phone].

## 2021-03-02 NOTE — TELEPHONE ENCOUNTER
PT left fax number to fax [facility] paperwork. [phone] [place]  Patient would also like a copy mailed to her home address.

## 2021-03-03 NOTE — TELEPHONE ENCOUNTER
Pt called back states she needs the [facility] paperwork faxed today Shantel Ana [phone]

## 2021-03-11 DIAGNOSIS — E03.9 ACQUIRED HYPOTHYROIDISM: ICD-10-CM

## 2021-03-11 RX ORDER — LEVOTHYROXINE SODIUM 88 UG/1
TABLET ORAL
Qty: 88 TABLET | Refills: 0 | Status: SHIPPED | OUTPATIENT
Start: 2021-03-11 | End: 2021-05-21 | Stop reason: SDUPTHER

## 2021-03-11 RX ORDER — LEVOTHYROXINE SODIUM 88 UG/1
TABLET ORAL
Qty: 90 TABLET | Refills: 1 | Status: CANCELLED | OUTPATIENT
Start: 2021-03-11

## 2021-03-11 NOTE — TELEPHONE ENCOUNTER
Requested Prescriptions     Pending Prescriptions Disp Refills    levothyroxine (SYNTHROID) 88 MCG tablet 88 tablet 0     Sig: Take 1 tablet by mouth every day.          LAST OV: 02/19/2021  NEXT OV: 5/21/2021

## 2021-05-21 ENCOUNTER — OFFICE VISIT (OUTPATIENT)
Dept: ENDOCRINOLOGY | Age: 31
End: 2021-05-21
Payer: COMMERCIAL

## 2021-05-21 VITALS
WEIGHT: 202 LBS | HEIGHT: 70 IN | OXYGEN SATURATION: 99 % | HEART RATE: 98 BPM | BODY MASS INDEX: 28.92 KG/M2 | SYSTOLIC BLOOD PRESSURE: 136 MMHG | DIASTOLIC BLOOD PRESSURE: 86 MMHG

## 2021-05-21 DIAGNOSIS — E66.3 OVERWEIGHT (BMI 25.0-29.9): ICD-10-CM

## 2021-05-21 DIAGNOSIS — E06.3 HASHIMOTO'S THYROIDITIS: ICD-10-CM

## 2021-05-21 DIAGNOSIS — R63.5 WEIGHT GAIN, ABNORMAL: ICD-10-CM

## 2021-05-21 DIAGNOSIS — E53.8 FOLIC ACID DEFICIENCY: ICD-10-CM

## 2021-05-21 DIAGNOSIS — R73.01 IFG (IMPAIRED FASTING GLUCOSE): ICD-10-CM

## 2021-05-21 DIAGNOSIS — E03.9 ACQUIRED HYPOTHYROIDISM: Primary | ICD-10-CM

## 2021-05-21 DIAGNOSIS — N91.5 OLIGOMENORRHEA, UNSPECIFIED TYPE: ICD-10-CM

## 2021-05-21 PROCEDURE — 99214 OFFICE O/P EST MOD 30 MIN: CPT | Performed by: INTERNAL MEDICINE

## 2021-05-21 RX ORDER — LEVOTHYROXINE SODIUM 0.05 MG/1
50 TABLET ORAL DAILY
Qty: 30 TABLET | Refills: 0
Start: 2021-05-21

## 2021-05-21 RX ORDER — LIOTHYRONINE SODIUM 5 UG/1
TABLET ORAL
Qty: 30 TABLET | Refills: 3
Start: 2021-05-21

## 2021-05-21 NOTE — PROGRESS NOTES
SUBJECTIVE:  Cristi Galvan is a 36 y.o. female who is being evaluated for hypothyroidism. 1. Acquired hypothyroidism    This started in 2019. Patient was diagnosed with hypothyroidism. The problem has been gradually worsening. Previous thyroid studies include: TSH and free thyroxine. Patient started medication in 2019. Currently patient is on: levothyroxine, liothyronine. Misses  0 doses a month. Current complaints: fatigue, dry skin, brain fog, decrease in memory. Patient stated that she experiences flareups once in 1 to 3 months when she has extreme fatigue, brain fog, cannot remember things and has difficulty performing daily duties or work. She states that she sleeps a lot during these episodes. They last 1 day. May improve with rest.  She experiences these flareups more during episodes of stress, and improving of stress level or rest helps with them. Last in 4/2021  Periods light on IUD    Patient started seeing functional medicine specialist Dr. Tiffanie Dickinson.  Her levothyroxine dose was decreased and she was started on liothyronine. Liothyronine dose is disproportionately high. 2. Hashimoto's thyroiditis  History of obstructive symptoms: difficulty swallowing No, changes in voice/hoarseness No.  History of radiation to patient's neck: No  Resent iodine exposure: No  Family history includes no thyroid abnormalities. Family history of thyroid cancer: No    3. Overweight  Gained 50 lbs in 1 year. There was no change in diet and activity level. On modified paleo diet, gluten free, dairy free. Eats healthy. Ideal weight 145 lbs. Goal weight 145-155 lbs    4. Weight gain, abnormal  Gained 50 lbs in 1 year. 5. Oligomenorrhea  Irregular prior to BCP and IUD  Longest time without period 2.5 months. No hirsutism    6. Elevated fasting glucose   Glucose 106    7. Folic acid deficiency  Has fatigue  Folate 4.22    IMPRESSION:    No focal lesion identified. Normal study.     Approved by [facility] Maikel Coates MD on 5/20/2020 8:26 AM EDT    I have personally reviewed the images and I agree with this report. Report Verified by: Stefan Parker MD at 5/20/2020 8:44 AM EDT   Result Narrative   EXAM: US THYROID-NECK-HEAD     INDICATION:  Myxedema heart disease, Myxedema heart disease    COMPARISON:  None    TECHNIQUE:  Multiplanar grayscale analysis of the thyroid was performed. FINDINGS:    The thyroid gland appears normal in size, contour, and echogenicity bilaterally. Isthmus: 3 mm in anteroposterior dimension. No cysts or nodules are seen    Right Lobe: 4 x 2 x 1.3 cm. No cysts or nodules are seen    Left Lobe: 3 x 1.3 x 1.1 cm. No cysts or nodules are seen   Other Result Information   Interface, Results In - 05/20/2020  8:46 AM EDT  EXAM: US THYROID-NECK-HEAD     INDICATION:  Myxedema heart disease, Myxedema heart disease    COMPARISON:  None    TECHNIQUE:  Multiplanar grayscale analysis of the thyroid was performed. FINDINGS:    The thyroid gland appears normal in size, contour, and echogenicity bilaterally. Isthmus: 3 mm in anteroposterior dimension. No cysts or nodules are seen    Right Lobe: 4 x 2 x 1.3 cm. No cysts or nodules are seen    Left Lobe: 3 x 1.3 x 1.1 cm. No cysts or nodules are seen        IMPRESSION:    No focal lesion identified. Normal study. Approved by Yonis Lakhani MD on 5/20/2020 8:26 AM EDT    I have personally reviewed the images and I agree with this report.     Report Verified by: Stefan Parker MD at 5/20/2020 8:44 AM EDT         Past Medical History:   Diagnosis Date    Autoimmune disorder ([address]     Thyroid disease      Patient Active Problem List    Diagnosis Date Noted    IFG (impaired fasting glucose) 97/78/9467    Folic acid deficiency 42/28/4483    Acquired hypothyroidism 06/09/2020    Hashimoto's thyroiditis 06/09/2020    Class 1 obesity with body mass index (BMI) of 34.0 to 34.9 in adult 06/09/2020    Weight gain, abnormal 06/09/2020    Oligomenorrhea 06/09/2020     Past Surgical History:   Procedure Laterality Date    NASAL SEPTUM SURGERY      TONSILLECTOMY AND ADENOIDECTOMY  2001    WISDOM TOOTH EXTRACTION  2011     Family History   Problem Relation Age of Onset    No Known Problems Mother     Hypertension Father     High Cholesterol Father      Social History     Socioeconomic History    Marital status:      Spouse name: None    Number of children: None    Years of education: None    Highest education level: None   Occupational History    None   Tobacco Use    Smoking status: Never Smoker    Smokeless tobacco: Never Used   Vaping Use    Vaping Use: Never used   Substance and Sexual Activity    Alcohol use: Not Currently    Drug use: Never    Sexual activity: Yes     Partners: Male   Other Topics Concern    None   Social History Narrative    None     Social Determinants of Health     Financial Resource Strain:     Difficulty of Paying Living Expenses:    Food Insecurity:     Worried About Running Out of Food in the Last Year:     Ran Out of Food in the Last Year:    Transportation Needs:     Lack of Transportation (Medical):      Lack of Transportation (Non-Medical):    Physical Activity:     Days of Exercise per Week:     Minutes of Exercise per Session:    Stress:     Feeling of Stress :    Social Connections:     Frequency of Communication with Friends and Family:     Frequency of Social Gatherings with Friends and Family:     Attends Mosque Services:     Active Member of Clubs or Organizations:     Attends Club or Organization Meetings:     Marital Status:    Intimate Partner Violence:     Fear of Current or Ex-Partner:     Emotionally Abused:     Physically Abused:     Sexually Abused:      Current Outpatient Medications   Medication Sig Dispense Refill    levothyroxine (SYNTHROID) 50 MCG tablet Take 1 tablet by mouth Daily 30 tablet 0    folic acid (FOLATE) 717 MCG tablet Take 2 tablets by mouth daily 30 decrease in memory, no balance problems  Psychiatric: has anxiety, no depression, has insomnia  Hematologic/Lymphatic: no tendency for easy bleeding, no swollen lymph nodes, no tendency for easy bruising  Immunology: has seasonal allergies, no frequent infections, no frequent illnesses  Endocrine: no temperature intolerance    /86   Pulse 98   Ht 5' 9.5\" (1.765 m)   Wt 202 lb (91.6 kg)   SpO2 99%   BMI 29.40 kg/m²    Wt Readings from Last 3 Encounters:   05/21/21 202 lb (91.6 kg)   02/19/21 216 lb (98 kg)   10/16/20 222 lb (100.7 kg)     Body mass index is 29.4 kg/m².     OBJECTIVE:  Constitutional: no acute distress, well appearing and well nourished  Psychiatric: oriented to person, place and time, judgement and insight and normal, recent and remote memory and intact and mood and affect are normal  Skin: skin and subcutaneous tissue is normal without mass, normal turgor  Head and Face: examination of head and face revealed no abnormalities  Eyes: no lid or conjunctival swelling, erythema or discharge, pupils are normal, equal, round, reactive to light  Ears/Nose: external inspection of ears and nose revealed no abnormalities, hearing is grossly normal  Oropharynx/Mouth/Face: lips, tongue and gums are normal with no lesions, the voice quality was normal  Neck: neck is supple and symmetric, with midline trachea and no masses, thyroid is normal  Lymphatics: normal cervical lymph nodes, normal supraclavicular nodes  Pulmonary: no increased work of breathing or signs of respiratory distress, lungs are clear to auscultation  Cardiovascular: normal heart rate and rhythm, normal S1 and S2, no murmurs and pedal pulses and 2+ bilaterally, No edema  Abdomen: abdomen is soft, non-tender with no masses  Musculoskeletal: normal gait and station and exam of the digits and nails are normal  Neurological: normal coordination and normal general cortical function      Lab Review:    No results found for: WBC, HGB, HCT, MCV, PLT  Lab Results   Component Value Date     02/19/2021    K 4.2 02/19/2021     02/19/2021    CO2 26 02/19/2021    BUN 10 02/19/2021    CREATININE 0.7 02/19/2021    GLUCOSE 89 02/19/2021    CALCIUM 9.7 02/19/2021    PROT 7.2 02/19/2021    LABALBU 4.4 02/19/2021    BILITOT <0.2 02/19/2021    ALKPHOS 100 02/19/2021    AST 14 02/19/2021    ALT 11 02/19/2021    LABGLOM >60 02/19/2021    GFRAA >60 02/19/2021    AGRATIO 1.6 02/19/2021    GLOB 2.8 02/19/2021     Lab Results   Component Value Date    TSH 4.63 02/19/2021    FT3 3.5 02/19/2021     Lab Results   Component Value Date    LABA1C 5.3 02/19/2021     Lab Results   Component Value Date    .4 02/19/2021     No results found for: CHOL  No results found for: TRIG  No results found for: HDL  No results found for: LDLCHOLESTEROL, LDLCALC  No results found for: LABVLDL, VLDL  No results found for: CHOLHDLRATIO  No results found for: LABMICR, GLHA24DFM  No results found for: VITD25     ASSESSMENT/PLAN:  1. Acquired hypothyroidism  No palpitations, anxiety, insomnia  Managed by Dr. Amy Colon  TSH 0.99-2.1-4.63-0.590  FT4 1.79  FT3 3.7  Reverse T3 20.2  2 weeks ago patient started liothyronine and decreased levothyroxine per functional medicine specialist Dr. Amy Colon.  Started liothyronine 6 mcg 2 tabs in AM, 1 tab in PM  Levothyroxine 0.050 mg daily  Advised patient that liothyronine dose is disproportionately high compared with levothyroxine dose. Patient stated that she feels better. Patient chose to continue her treatment with Dr. Amy Colon.  - TSH without Reflex; Future  - T3, Free; Future  - T4, Free; Future    2. Hashimoto's thyroiditis  TPO antibody positive  -Thyroid sonogram normal    3. Overweight  -Diet and exercise    4. Weight gain, abnormal  Gained 50 lbs in 1 year. - Salivary Cortisol normal    5.  Oligomenorrhea  Free Testosterone, DHEAS normal  Obtain Pelvic US report from OB-GYN  Irregular prior to BCP and IUD  Longest time without period 2.5 months. No hirsutism  Testosterone, DHEA-S normal  - ACTH, cortisol normal  - Salivary cortisol normal  - LH, FSH, estradiol  - Prolactin normal    6. Elevated fasting glucose   Glucose 106-104 fasting  HbA1C 5.5-5.3    7. Folic acid deficiency  Folate 4.22-14  B12 618  Continue folate    Reviewed and/or ordered clinical lab results Yes  Reviewed and/or ordered radiology tests Yes   Reviewed and/or ordered other diagnostic tests No  Discussed test results with performing physician No  Independently reviewed image, tracing, or specimen No  Made a decision to obtain old records No  Reviewed and summarized old records Yes   TSH 3.8-4.47  T4 8.8  T3 129  TPO antibody 51, elevated  Obtained history from other than patient No    Felicitas Garcia was counseled regarding symptoms of thyroid diagnosis, course and complications of disease if inadequately treated, side effects of medications, diagnosis, treatment options, and prognosis, risks, benefits, complications, and alternatives of treatment, labs, imaging and other studies and treatment targets and goals, work-up, treatment options, side effects of her over replacement with liothyronine and levothyroxine therapy, liothyronine side effects, medication induced hyperthyroidism. She understands instructions and counseling. Patient will continue to follow with functional medicine specialist Dr. Deo Mata.    Total time I spent for this encounter 30 minutes    No follow-ups on file.     Electronically signed by Reuben Jensen MD on 5/21/2021 at 7:34 AM